# Patient Record
(demographics unavailable — no encounter records)

---

## 2024-11-19 NOTE — REVIEW OF SYSTEMS
[Nasal Congestion] : nasal congestion [Cough] : cough [Wheezing] : wheezing [Urticaria] : urticaria [Pruritus] : pruritus [Nl] : Cardiovascular

## 2024-11-19 NOTE — HISTORY OF PRESENT ILLNESS
[Eczematous rashes] : eczematous rashes [Venom Reactions] : venom reactions [Food Allergies] : food allergies [Drug Allergies] : drug allergies [de-identified] : 68 year old woman referred by geriatrics for evaluation of environmental allergies.  In September, she was coughing and wheezing for the first time.  She has year round congestion and frequent sinus infections.  She was taking sudafed and claritin daily but stopped due to heart rate.  She started xyzal instead and uses albuterol daily BID.  She uses fluticasone and ipratropium nasal spray.    She had allergy testing in her 20s and did allergy shots for about 3 years.  She gets hives usually triggered by heat/pressure.  She continues to get them and feels different things seem to trigger them. She had COVID on 9/2023.  She does feel like she has generalized inflammation.    She avoids preservatives (MSG) and tofu.  In her 20s, she got swelling from tofu.  Red wine was causing headaches and nasal congestion.  She tolerates white wine/aime wine.

## 2024-11-19 NOTE — SOCIAL HISTORY
[Bedroom] :  in bedroom [Dog] : dog [Living Area] : not in the living area [Smokers in Household] : there are no smokers in the home

## 2024-11-27 NOTE — PHYSICAL EXAM
[Alert] : alert [No Acute Distress] : in no acute distress [Sclera] : the sclera and conjunctiva were normal [EOMI] : extraocular movements were intact [No Oral Cyanosis] : no oral cyanosis [Normal Outer Ear/Nose] : the ears and nose were normal in appearance [Normal Appearance] : the appearance of the neck was normal [Normal] : no respiratory distress, no accessory muscle use, normal respiratory rhythm and effort, lungs were clear to auscultation bilaterally [Normal S1, S2] : normal S1 and S2 [Heart Rate And Rhythm] : heart rate was normal and rhythm regular [No Focal Deficits] : no focal deficits [Normal Affect] : the affect was normal [Normal Mood] : the mood was normal [de-identified] : erythema of posterior pharynx

## 2024-11-27 NOTE — REVIEW OF SYSTEMS
[As Noted in HPI] : as noted in HPI [Negative] : Psychiatric [Fever] : no fever [Chills] : no chills [Eyesight Problems] : no eyesight problems

## 2024-11-27 NOTE — ASSESSMENT
[FreeTextEntry1] : pharyngitis; home covid test negative today check rapid strep- negative in office today advised to monitor symptoms, and if develops new symptoms to repeat covid test.  sinus pressure/allergies: c/w nasal spray c/w xyzal high risk of recurrent sinusitis: Augmentin ordered, advised not to take unless symptoms progress and to notify us  htn: controlled c/w lisinopril 10mg daily

## 2024-11-27 NOTE — HISTORY OF PRESENT ILLNESS
[FreeTextEntry1] : 68yoF seen for acute visit for sore throat and nasal congestion x 1 day.  she took a home covid test that was negative. denies sick contacts denies fever, chills, coughing, or myalgias reports she had her flu shot on sunday remains on otc antihistamine and nasal spray she reports hx of sinusitis infections  htn: adherent with lisinopril denies side effects bp at goal

## 2024-12-02 NOTE — PHYSICAL EXAM
[Alert] : alert [No Acute Distress] : in no acute distress [Sclera] : the sclera and conjunctiva were normal [EOMI] : extraocular movements were intact [Normal Appearance] : the appearance of the neck was normal [No Respiratory Distress] : no respiratory distress [No Acc Muscle Use] : no accessory muscle use [Respiration, Rhythm And Depth] : normal respiratory rhythm and effort [Auscultation Breath Sounds / Voice Sounds] : lungs were clear to auscultation bilaterally [Normal S1, S2] : normal S1 and S2 [Heart Rate And Rhythm] : heart rate was normal and rhythm regular [de-identified] : no right sided pain to palpation over chest wall

## 2024-12-02 NOTE — HISTORY OF PRESENT ILLNESS
[FreeTextEntry1] : 68yof seen for acute visit  fever on friday started antibiotics, improved saturday. then worse feeling on sunday, with cough, mucus/phlegm, and feeling tired denies sick contacts also with new occasional right sided chest pain with coughing denies sob last night awoke in middle of night with coughing, needing albuterol, which helped

## 2024-12-02 NOTE — ASSESSMENT
[FreeTextEntry1] : cough and fever: check pcr for viral infection advised to check XRAy given right sided pain  c/w augmentin for sinusitus, other eitiology includes atypical PNA, vs viral infection start mucinex prn c/w albuterol  htn: lower side today asytmptomatic c/w lisinopril

## 2024-12-11 NOTE — HISTORY OF PRESENT ILLNESS
[Never] : never [TextBox_4] : 12/11/2024  never  seen by pulmonary  69y/o female nathalia in [facility] never smoker  ( second hand smoke  parents)   retired   research    --  allergic  reaction  to stripping allergic reaction fumes   went to allergist  in past -  with hives -  on xzyal   pm     HTN   wheezing   - sep 4/ 24   noted cough  wheezing seen by  primary then albuterol  prn  - covid hx  9/23   - thinks was sick   after thanksgiving  took abx  then  post nasal drip thick mucous  -

## 2024-12-11 NOTE — PHYSICAL EXAM
[IV] : Mallampati Class: IV [Normal Appearance] : normal appearance [Supple] : supple [No Neck Mass] : no neck mass [No JVD] : no jvd [Normal Rate/Rhythm] : normal rate/rhythm [Normal S1, S2] : normal s1, s2 [No Murmurs] : no murmurs [No Resp Distress] : no resp distress [No Acc Muscle Use] : no acc muscle use [Benign] : benign [Not Tender] : not tender [No Masses] : no masses [Soft] : soft [No Hernias] : no hernias [Normal Bowel Sounds] : normal bowel sounds [Normal Gait] : normal gait [No Clubbing] : no clubbing [No Edema] : no edema [No Rash] : no rash [No Motor Deficits] : no motor deficits [Normal Affect] : normal affect [TextBox_2] : pleasant f no distress no cough  [TextBox_11] : no lesion moist [TextBox_68] : bilateral air entry wheezing  after two inhalations albuterol good air entry  improved

## 2024-12-11 NOTE — REVIEW OF SYSTEMS
[Fatigue] : fatigue [Postnasal Drip] : postnasal drip [Cough] : cough [Sputum] : sputum [Wheezing] : wheezing [SOB on Exertion] : sob on exertion [Obesity] : obesity [Fever] : no fever [Recent Wt Gain (___ Lbs)] : ~T no recent weight gain [Chills] : no chills [Recent Wt Loss (___ Lbs)] : ~T no recent weight loss [Sore Throat] : no sore throat [Nasal Congestion] : no nasal congestion [Sinus Problems] : no sinus problems [Hemoptysis] : no hemoptysis [Dyspnea] : no dyspnea [Chest Discomfort] : no chest discomfort [Palpitations] : no palpitations [GERD] : no gerd [Abdominal Pain] : no abdominal pain [Nausea] : no nausea [Vomiting] : no vomiting [Bleeding] : no bleeding [Blood Transfusion] : no blood transfusion [Clotting Disorder/ Frequent bleeding] : no clotting disorder/ frequent bleeding [Diabetes] : no diabetes [Thyroid Problem] : no thyroid problem

## 2024-12-11 NOTE — ASSESSMENT
[FreeTextEntry1] : 67y/o female   yesi rsmoker  1- wheezing with allergies     likely     asthma  exacerbation  2- dog 3- covid hx--  post covid   fatigue since   4- vaccination  flu +     covid shingles x one   Recommendations 1- PFT 2- prednisone taper 3- add   breztri bid   teaching  4-good response  to albuterol MDI two inh   prn   5- blood testing  eos and   strongyloid  return in two weeks  - chart reviewed medications and  teaching

## 2024-12-12 NOTE — ASSESSMENT
[FreeTextEntry1] : cough: much improved after starting treatment for asthma exacerbation per pulmonary c/w plan as per pulmonary  htn: c/w lisinopril stable  vit D def: last level 30 c/w daily Maintenace dose

## 2024-12-24 NOTE — PHYSICAL EXAM
[IV] : Mallampati Class: IV [Normal Appearance] : normal appearance [Supple] : supple [No Neck Mass] : no neck mass [No JVD] : no jvd [Normal Rate/Rhythm] : normal rate/rhythm [Normal S1, S2] : normal s1, s2 [No Murmurs] : no murmurs [No Resp Distress] : no resp distress [No Acc Muscle Use] : no acc muscle use [Clear to Auscultation Bilaterally] : clear to auscultation bilaterally [Benign] : benign [Not Tender] : not tender [No Masses] : no masses [No Hernias] : no hernias [Soft] : soft [Normal Gait] : normal gait [No Clubbing] : no clubbing [No Edema] : no edema [No Rash] : no rash [No Motor Deficits] : no motor deficits [Normal Affect] : normal affect [TextBox_2] : pleasant fno distress no cough no sob [TextBox_11] : no lesion moist  crowded

## 2024-12-24 NOTE — HISTORY OF PRESENT ILLNESS
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

## 2024-12-24 NOTE — REVIEW OF SYSTEMS
[Fatigue] : fatigue [Postnasal Drip] : postnasal drip [Wheezing] : wheezing [SOB on Exertion] : sob on exertion [Obesity] : obesity [Fever] : no fever [Recent Wt Gain (___ Lbs)] : ~T no recent weight gain [Chills] : no chills [Recent Wt Loss (___ Lbs)] : ~T no recent weight loss [Sore Throat] : no sore throat [Nasal Congestion] : no nasal congestion [Sinus Problems] : no sinus problems [Cough] : no cough [Hemoptysis] : no hemoptysis [Sputum] : no sputum [Dyspnea] : no dyspnea [Chest Discomfort] : no chest discomfort [Palpitations] : no palpitations [GERD] : no gerd [Abdominal Pain] : no abdominal pain [Nausea] : no nausea [Vomiting] : no vomiting [Bleeding] : no bleeding [Blood Transfusion] : no blood transfusion [Clotting Disorder/ Frequent bleeding] : no clotting disorder/ frequent bleeding [Diabetes] : no diabetes [Thyroid Problem] : no thyroid problem

## 2024-12-24 NOTE — ASSESSMENT
[FreeTextEntry1] : 69y/o female   never  smoker  1-     asthma  exacerbation  improved  2- dog 3- covid hx--  post covid   fatigue since   4- vaccination  flu +     covid shingles x one   Recommendations 1- PFT 2-     s/p steroids    3-   d/ c trelegy  4-good response  to albuterol MDI two inh   prn  5- breo  200 qd  rinse after use  taper off trelegy discussed  breo then  follow up    agreement on plan

## 2025-02-26 NOTE — PHYSICAL EXAM
[III] : Mallampati Class: III [Normal Appearance] : normal appearance [Supple] : supple [No Neck Mass] : no neck mass [No JVD] : no jvd [Normal Rate/Rhythm] : normal rate/rhythm [Normal S1, S2] : normal s1, s2 [No Murmurs] : no murmurs [No Resp Distress] : no resp distress [Clear to Auscultation Bilaterally] : clear to auscultation bilaterally [Kyphosis] : kyphosis [Benign] : benign [Not Tender] : not tender [No Masses] : no masses [Soft] : soft [Normal Bowel Sounds] : normal bowel sounds [Normal Gait] : normal gait [No Clubbing] : no clubbing [No Edema] : no edema [No Rash] : no rash [No Motor Deficits] : no motor deficits [Normal Affect] : normal affect [TextBox_2] : pleasant fn o distress no cough no sob [TextBox_11] : crowded no lesion

## 2025-02-26 NOTE — HISTORY OF PRESENT ILLNESS
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   went to allergist  in past -  with hives -  on xzyal   pm     HTN   wheezing   - sep 4/ 24   noted cough  wheezing seen by  primary then albuterol  prn  - covid hx  9/23   - thinks was sick   after thanksgiving  took abx  then  post nasal drip thick mucous  -  12/24/2024 67y/o female   never smoker   asthmatic bronchitis f/u  - had wheezing took medrol and  trelegy  ( covered)  and did well   -then mucinex   improved now -now improved overall  -no chest pain no palpitations  2/26/2025 67y/o  female never smoker   asthmatic   bronchitis - currently much  improved  - tapered off trelegy then changed to    breo  - no chest pain  -

## 2025-02-26 NOTE — HISTORY OF PRESENT ILLNESS
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   went to allergist  in past -  with hives -  on xzyal   pm     HTN   wheezing   - sep 4/ 24   noted cough  wheezing seen by  primary then albuterol  prn  - covid hx  9/23   - thinks was sick   after thanksgiving  took abx  then  post nasal drip thick mucous  -  12/24/2024 67y/o female   never smoker   asthmatic bronchitis f/u  - had wheezing took medrol and  trelegy  ( covered)  and did well   -then mucinex   improved now -now improved overall  -no chest pain no palpitations  2/26/2025 67y/o  female never smoker   asthmatic   bronchitis - currently much  improved  - tapered off trelegy then changed to    breo  - no chest pain  -

## 2025-02-26 NOTE — ASSESSMENT
[FreeTextEntry1] : 69y/o female   never  smoker improved overall   1-   eosinophilic +   asthma   2- dog 3- covid hx--  post covid   fatigue since   4- vaccination  flu +     covid shingles x one   Recommendations 1- PFT 2- good response  to albuterol MDI two inh   prn  3-  breo  200 qd  rinse after use  - discussion  medications     and   reviewed above

## 2025-04-23 NOTE — HISTORY OF PRESENT ILLNESS
[FreeTextEntry1] : 68yoF with HTN, and HLD, asthma who is seen for follow up visit.  htn: bp lower side denies lightheadedness or chest pain, adherent with medication  hld: adherent with crestor, denies s/e  hx of stones and parapelvic cyst hasn't followed up with urology, denies any new urinary sytmpoms  dm2: not currently taking medications  has elevated eye pressure found with beginning of cataracts following with opthal Dr. Mcgraw

## 2025-04-23 NOTE — PHYSICAL EXAM
[Alert] : alert [No Acute Distress] : in no acute distress [Sclera] : the sclera and conjunctiva were normal [EOMI] : extraocular movements were intact [Normal Outer Ear/Nose] : the ears and nose were normal in appearance [Normal Appearance] : the appearance of the neck was normal [Normal] : heart rate was normal and rhythm regular, normal S1 and S2 [Edema] : edema was not present [No Clubbing, Cyanosis] : no clubbing or cyanosis of the fingernails [Involuntary Movements] : no involuntary movements were seen [Normal Color / Pigmentation] : normal skin color and pigmentation [No Focal Deficits] : no focal deficits [Normal Affect] : the affect was normal [Normal Mood] : the mood was normal

## 2025-04-23 NOTE — ASSESSMENT
[FreeTextEntry1] : HLD: repeat LDL c/w crestor 10mg daily stable  HTN: lower side reduce lisinopril from 10mg to 5mg daily home bp log  dm2: a1c 6.6% in 9/2024 repeat a1c diet and exercise discussed

## 2025-04-23 NOTE — REVIEW OF SYSTEMS
[Eyesight Problems] : eyesight problems [As Noted in HPI] : as noted in HPI [Negative] : Psychiatric [de-identified] : skin lesion near eye

## 2025-05-16 NOTE — HISTORY OF PRESENT ILLNESS
[FreeTextEntry1] : 68yoF seen for follow up visit.  recently dx with glaucoma- following with Dr. Mcgraw  newly dx Dm2: a1c of 6.9% also with weight gain denies personal or familly hx of MEN or thyroid cancers  htn: home bp log has been elevated, so she restarted lisinorpil 10mg denies lightheadedness  4/25 K was high, repeat was normal at 4.7

## 2025-05-16 NOTE — PHYSICAL EXAM
[Alert] : alert [No Acute Distress] : in no acute distress [Sclera] : the sclera and conjunctiva were normal [EOMI] : extraocular movements were intact [Normal Outer Ear/Nose] : the ears and nose were normal in appearance [Normal Appearance] : the appearance of the neck was normal [No Respiratory Distress] : no respiratory distress [No Acc Muscle Use] : no accessory muscle use [Respiration, Rhythm And Depth] : normal respiratory rhythm and effort [Involuntary Movements] : no involuntary movements were seen [Normal Affect] : the affect was normal [Normal Mood] : the mood was normal

## 2025-05-16 NOTE — ASSESSMENT
[FreeTextEntry1] : dm2: newly dx counselling provided start mounjaro 2.5mg weekly counselled on side effects to monitor for  htn: controled on home bp c/w lisinopril 10mg daily  glaucoma: f/u with opthal as advised

## 2025-05-28 NOTE — ASSESSMENT
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

## 2025-05-28 NOTE — HISTORY OF PRESENT ILLNESS
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   went to allergist  in past -  with hives -  on xzyal   pm     HTN   wheezing   - sep 4/ 24   noted cough  wheezing seen by  primary then albuterol  prn  - covid hx  9/23   - thinks was sick   after thanksgiving  took abx  then  post nasal drip thick mucous  -  12/24/2024 67y/o female   never smoker   asthmatic bronchitis f/u  - had wheezing took medrol and  trelegy  ( covered)  and did well   -then mucinex   improved now -now improved overall  -no chest pain no palpitations  2/26/2025 67y/o  female never smoker   asthmatic   bronchitis - currently much  improved  - tapered off trelegy then changed to    breo  - no chest pain  -  5/28/2025 67y/o  female  never  smoker asthmatic  bronchitis   - dog  - doing well  - on breo now - PFT normal   NIOX  27 ppb -  no cough  no sob -manjaro  started  some headache    nausea -

## 2025-05-28 NOTE — PROCEDURE
[FreeTextEntry1] : PFT  5/28/2025 good efforts spirometry   normal  FVC  FEV1  FEV1/FVC   normal    FEF 25-75%  normal  TLC normal DLCO   Impression normal spirometry TLC and DLCO

## 2025-05-28 NOTE — PHYSICAL EXAM
[III] : Mallampati Class: III [Normal Appearance] : normal appearance [Supple] : supple [No Neck Mass] : no neck mass [No JVD] : no jvd [Normal Rate/Rhythm] : normal rate/rhythm [Normal S1, S2] : normal s1, s2 [No Murmurs] : no murmurs [No Resp Distress] : no resp distress [No Acc Muscle Use] : no acc muscle use [Clear to Auscultation Bilaterally] : clear to auscultation bilaterally [Benign] : benign [Not Tender] : not tender [No Masses] : no masses [Soft] : soft [Normal Bowel Sounds] : normal bowel sounds [Normal Gait] : normal gait [Gait - Sufficient For Exercise Testing] : gait sufficient for exercise testing [No Clubbing] : no clubbing [No Edema] : no edema [No Rash] : no rash [No Motor Deficits] : no motor deficits [Normal Affect] : normal affect [TextBox_2] : pleasant f no distress  no cough no sob [TextBox_11] : crowded  no lesion moist

## 2025-06-03 NOTE — HISTORY OF PRESENT ILLNESS
[No falls in past year] : Patient reported no falls in the past year [FreeTextEntry1] : 68 yo female w/ a hx of htn, hld, DM presents to the office for an acute visit. Pt started Mounjaro once a week injection last tuesday. On Thursday she began having diarrhea. Pt is having 6+ watery bowel movements in 24 hours. Denies any blood in the stool. Her highest temperature was noted to be 99. Denies vomiting but does report nausea. Denies dizziness, lightheadedness, chest pain, sob, cough, congestion.  She is eating light food such as soup with rice, bananas, crackers. Denies having fatty food in the last several days. No sick contacts reported.  She is trying to stay well hydrated.  HTN Blood pressure today is 100/68 No chest pain, lightheadedness, dizziness, palpitations reported.    HLD Not taking any medications. Diet and exercise only.   Osteopenia  Taking calcium and vitamin D

## 2025-06-03 NOTE — REVIEW OF SYSTEMS
[Diarrhea] : diarrhea [As Noted in HPI] : as noted in HPI [Fever] : no fever [Chills] : no chills [Loss Of Hearing] : no hearing loss [Heart Rate Is Slow] : the heart rate was not slow [Heart Rate Is Fast] : the heart rate was not fast [Shortness Of Breath] : no shortness of breath [Wheezing] : no wheezing [Cough] : no cough [Abdominal Pain] : no abdominal pain [Confused] : no confusion [Dizziness] : no dizziness [Anxiety] : no anxiety [Depression] : no depression

## 2025-06-03 NOTE — PHYSICAL EXAM
[Alert] : alert [EOMI] : extraocular movements were intact [Normal Appearance] : the appearance of the neck was normal [Supple] : the neck was supple [No Respiratory Distress] : no respiratory distress [No Acc Muscle Use] : no accessory muscle use [Respiration, Rhythm And Depth] : normal respiratory rhythm and effort [Auscultation Breath Sounds / Voice Sounds] : lungs were clear to auscultation bilaterally [Normal S1, S2] : normal S1 and S2 [Heart Rate And Rhythm] : heart rate was normal and rhythm regular [Bowel Sounds] : normal bowel sounds [Abdomen Tenderness] : non-tender [Abdomen Soft] : soft [No Spinal Tenderness] : no spinal tenderness [Normal Gait] : normal gait [Normal Color / Pigmentation] : normal skin color and pigmentation [No Focal Deficits] : no focal deficits [Normal Affect] : the affect was normal [Normal Mood] : the mood was normal

## 2025-06-13 NOTE — ASSESSMENT
[FreeTextEntry1] : ayush: prerenal from diarrhea from s/e of medication hydration avoid nephrotoxins  htn: controlled c/w lisinopril  dm2: unable to tolerate mounjaro- adverse reaction- listed in allergies discussed will likely start metformin in future, not now as likely may also cause diarrhea, will hold off f/u 1 month

## 2025-06-13 NOTE — HISTORY OF PRESENT ILLNESS
[FreeTextEntry1] : Hospital Course: Discharge Date 09-Jun-2025 Admission Date 05-Jun-2025 14:46 Reason for Admission STEFANIE  68 year old female with past medical history of HTN, HLD, type 2 diabetes, asthma, seasonal allergies, who  is seen sp hospitalization for diarrhea in seting of recent starting of mounjaro and found to have STEFANIE. treated with IVF CT abd/pelvis showed No acute inflammatory or obstructive pathology. Diverticulosis coli without acute diverticulitis.  Cr 2.13 on admission- her ace was held f/u cr improved to wnl normal cbc  dm2: unable to tolerate mounjaro a1c6.9% april 2025  was able to have BM Thursday night- was constipation- needed to manually remove normal formed bm today

## 2025-06-13 NOTE — PHYSICAL EXAM
[Sclera] : the sclera and conjunctiva were normal [EOMI] : extraocular movements were intact [Normal Outer Ear/Nose] : the ears and nose were normal in appearance [Normal Appearance] : the appearance of the neck was normal [Normal] : heart rate was normal and rhythm regular, normal S1 and S2 [Edema] : edema was not present [Involuntary Movements] : no involuntary movements were seen [No Focal Deficits] : no focal deficits [Normal Affect] : the affect was normal [Normal Mood] : the mood was normal

## 2025-07-18 NOTE — HISTORY OF PRESENT ILLNESS
[FreeTextEntry1] : 68yof with pmhx of HTN, HLD, dm2, asthma, seen for follow up.  reports normal BM's denies diarrhea has been eating healthy walking more and no longer drinking  dm2: unable to tolerate mounjaro  a1c 6.9% april 2025 s/p hospitlization due to diarrhea 6/2025 2/2 [employer]  htn: adherent  with medication

## 2025-07-18 NOTE — ASSESSMENT
[FreeTextEntry1] : dm2: unable to tolerate mounjaro- adverse reaction- listed in allergies given diet, exercises modifications, plan to repeat a1c in 3 months hold off on new mediacation at this time  htn: controlled c/w lisinopril